# Patient Record
Sex: FEMALE | Race: WHITE | NOT HISPANIC OR LATINO | URBAN - METROPOLITAN AREA
[De-identification: names, ages, dates, MRNs, and addresses within clinical notes are randomized per-mention and may not be internally consistent; named-entity substitution may affect disease eponyms.]

---

## 2024-07-25 ENCOUNTER — TELEPHONE (OUTPATIENT)
Dept: OPHTHALMOLOGY | Facility: CLINIC | Age: 64
End: 2024-07-25

## 2024-07-25 NOTE — TELEPHONE ENCOUNTER
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 Van (very unpleasant experience)  She was repaired by Dr. Yaneth Blanco and has been clear of any new findings but was calling to find out if there was a doctor in the state that could also follow her or be available to see her IF another lesion comes about. She feels very comforted with the fact now knowing Dr. English could help her in the future if needed. I explain that we will need ALL of the record from ALL of the doctors involving in her case including any imaging and pathology reports. She agrees to work on getting those records for us.